# Patient Record
Sex: MALE | Employment: OTHER | URBAN - METROPOLITAN AREA
[De-identification: names, ages, dates, MRNs, and addresses within clinical notes are randomized per-mention and may not be internally consistent; named-entity substitution may affect disease eponyms.]

---

## 2017-09-22 ENCOUNTER — THERAPY VISIT (OUTPATIENT)
Dept: PHYSICAL THERAPY | Facility: CLINIC | Age: 52
End: 2017-09-22

## 2017-09-22 DIAGNOSIS — M25.511 CHRONIC RIGHT SHOULDER PAIN: Primary | ICD-10-CM

## 2017-09-22 DIAGNOSIS — G89.29 CHRONIC RIGHT SHOULDER PAIN: Primary | ICD-10-CM

## 2017-09-22 DIAGNOSIS — S86.111D STRAIN OF RIGHT GASTROCNEMIUS MUSCLE, SUBSEQUENT ENCOUNTER: ICD-10-CM

## 2017-09-22 PROCEDURE — 97110 THERAPEUTIC EXERCISES: CPT | Mod: GP | Performed by: PHYSICAL THERAPIST

## 2017-09-22 PROCEDURE — 97035 APP MDLTY 1+ULTRASOUND EA 15: CPT | Mod: GP | Performed by: PHYSICAL THERAPIST

## 2017-09-22 PROCEDURE — 97161 PT EVAL LOW COMPLEX 20 MIN: CPT | Mod: GP | Performed by: PHYSICAL THERAPIST

## 2017-09-22 NOTE — MR AVS SNAPSHOT
"              After Visit Summary   2017    Eliot Oseguera    MRN: [id number]           Patient Information     Date Of Birth          1965        Visit Information        Provider Department      2017 7:30 AM Dorina Pagan PT [facility]        Today's Diagnoses     Chronic right shoulder pain    -  1    Strain of right gastrocnemius muscle, subsequent encounter           Follow-ups after your visit        Who to contact     If you have questions or need follow up information about today's clinic visit or your schedule please contact [facility] directly at [phone].  Normal or non-critical lab and imaging results will be communicated to you by Onepagerhart, letter or phone within 4 business days after the clinic has received the results. If you do not hear from us within 7 days, please contact the clinic through Onepagerhart or phone. If you have a critical or abnormal lab result, we will notify you by phone as soon as possible.  Submit refill requests through Fetchnotes or call your pharmacy and they will forward the refill request to us. Please allow 3 business days for your refill to be completed.          Additional Information About Your Visit        MyChart Information     Fetchnotes lets you send messages to your doctor, view your test results, renew your prescriptions, schedule appointments and more. To sign up, go to [email or web address] . Click on \"Log in\" on the left side of the screen, which will take you to the Welcome page. Then click on \"Sign up Now\" on the right side of the page.     You will be asked to enter the access code listed below, as well as some personal information. Please follow the directions to create your username and password.     Your access code is: [email or web address]  Expires: 2017  5:05 PM     Your access code will  in 90 days. If you need help or a new code, " please call your [facility] clinic or [phone].        Care EveryWhere ID     This is your Care EveryWhere ID. This could be used by other organizations to access your [facility] medical records  [id number]         Blood Pressure from Last 3 Encounters:   No data found for BP    Weight from Last 3 Encounters:   No data found for Wt              We Performed the Following     FIDELINA Inital Eval Report     PT Eval, Low Complexity (52030)     Therapeutic Exercises     Ultrasound Therapy        Primary Care Provider    None Specified       No primary provider on file.        Equal Access to Services     [facility] : Hadii aad ku hadasho Soomaali, waaxda luqadaha, qaybta kaalmada adeegyada, waxay prosperin cheyannen chin cruz . So [facility] [phone].    ATENCIÓN: Si habla español, tiene a hong disposición servicios gratuitos de asistencia lingüística. Llame al [phone].    We comply with applicable federal civil rights laws and [place] laws. We do not discriminate on the basis of race, color, national origin, age, disability sex, sexual orientation or gender identity.            Thank you!     Thank you for choosing [facility]  for your care. Our goal is always to provide you with excellent care. Hearing back from our patients is one way we can continue to improve our services. Please take a few minutes to complete the written survey that you may receive in the mail after your visit with us. Thank you!             Your Updated Medication List - Protect others around you: Learn how to safely use, store and throw away your medicines at www.disposemymeds.org.      Notice  As of 9/22/2017  5:05 PM    You have not been prescribed any medications.

## 2017-09-22 NOTE — PROGRESS NOTES
[facility] Initial Evaluation          Subjective:    Patient is a 52 year old male presenting with rehab right shoulder hpi and rehab right ankle/foot hpi.   Eliot Oseguera is a 52 year old male with a right shoulder condition.  Condition occurred with:  Repetition/overuse (Pt. has had ongoing R sh pain x 1 year w/out incident. He feels it is related to UE lifting at the gym. He stopped lifting months ago but the pain continues. X-rays 6 mos ago were (-). He had 2 sessions of PT elsewhere about 6 mos ago with ex's w/out help.).  Condition occurred: for unknown reasons.  This is a chronic condition  2016.    Patient reports pain:  Anterior and lateral.  Radiates to:  Upper arm.  Pain is described as sharp and is intermittent and reported as 6/10.  Associated symptoms:  Loss of motion/stiffness and loss of strength. Pain is worse during the night.  Symptoms are exacerbated by using arm overhead, lifting and lying on extremity and relieved by rest.  Since onset symptoms are unchanged.  Special tests:  X-ray (negative).  Previous treatment includes physical therapy (2 sessions elsewhere).  There was no (He was shown theraband ex's but he did not do them because they hurt) improvement following previous treatment.  General health as reported by patient is good.                            Eliot Oseguera is a 52 year old male with a right ankle condition.  Condition occurred with:  Running (Pt. injured his R calf playing soccer 3 months ago. Pt. has rested then attempted to play several times since but the calf was aggravated each time. Pt. has no hx of R calf injury. ).  Condition occurred: during recreation/sport.  This is a chronic condition  6-22-17.    Site of Pain: R distal gastroc/soleus.  Radiates to:  No radiation.  Pain is described as sharp and is intermittent and reported as 6/10.  Associated symptoms:  Loss of motion/stiffness and loss of strength. Worse during: no time pattern.   Symptoms are exacerbated by running and relieved by rest.  Since onset symptoms are gradually improving.  Special testing: none.  Previous treatment: none.    General health as reported by patient is good.                                              Objective:    Standing Alignment:      Shoulder/UE:  Rounded shoulders              Gait:    Gait Type:  Antalgic     Deviations:  Ankle:  Push off decr R    Flexibility/Screens:   Positive screens:  Shoulder    Lower Extremity:      Decreased right lower extremity flexibility:  Gastroc and Soleus          Ankle/Foot Evaluation  ROM:    AROM:    Dorsiflexion:  Left:   10  Right:   10  Plantarflexion:  Left:  75    Right:  75            Strength:    Dorsiflexion:  Right: 5/5   Pain:  Plantarflexion: Right: 4/5  Pain:  Inversion:Right: 5/5  Pain:  Eversion:Right: 5/5  Pain:                  LIGAMENT TESTING: normal                PALPATION:     Right ankle tenderness present at:   gastroc/soleus  EDEMA: normal          MOBILITY TESTING: normal                             Shoulder Evaluation:  ROM:  AROM:    Flexion:  Left:  165    Right:  160    Abduction:  Left: 175   Right:  155    Internal Rotation:  Left:  75    Right:  65  External Rotation:  Left:  57    Right:  43                      Strength:    Flexion: Right: 4/5     Pain:   Extension:  Right: 4/5    Pain:  Abduction:  Right: 4-/5     Pain:    Internal Rotation:  Right: 4/5     Pain:  External Rotation:   Right:4-/5     Pain:    Horizontal Abduction:  Right:4-/5    Pain:        Stability Testing:  normal      Special Tests:      Right shoulder positive for the following special tests:Impingement  Right shoulder negative for the following special tests:Rotator cuff tear  Palpation:      Right shoulder tenderness present at: Infraspinatus and Teres Minor  Mobility Tests:  normal                                                 General     ROS    Assessment/Plan:      Patient is a 52 year old male with right side  shoulder and right side ankle complaints.    Patient has the following significant findings with corresponding treatment plan.                Diagnosis 1:  R gastroc/soleus strain  Pain -  US, manual therapy, self management and education  Decreased ROM/flexibility - manual therapy and therapeutic exercise  Decreased strength - therapeutic exercise and therapeutic activities  Impaired muscle performance - neuro re-education  Decreased function - therapeutic activities  Diagnosis 2:  R shoulder pain   Pain -  self management and education  Decreased ROM/flexibility - manual therapy and therapeutic exercise  Decreased strength - therapeutic exercise and therapeutic activities  Impaired muscle performance - neuro re-education  Decreased function - therapeutic activities    Therapy Evaluation Codes:   1) History comprised of:   Personal factors that impact the plan of care:      Time since onset of symptoms.    Comorbidity factors that impact the plan of care are:      None.     Medications impacting care: None.  2) Examination of Body Systems comprised of:   Body structures and functions that impact the plan of care:      Ankle and Shoulder.   Activity limitations that impact the plan of care are:      Lifting, Running and Sports.  3) Clinical presentation characteristics are:   Stable/Uncomplicated.  4) Decision-Making    Low complexity using standardized patient assessment instrument and/or measureable assessment of functional outcome.  Cumulative Therapy Evaluation is: Low complexity.    Previous and current functional limitations:  (See Goal Flow Sheet for this information)    Short term and Long term goals: (See Goal Flow Sheet for this information)     Communication ability:  Patient appears to be able to clearly communicate and understand verbal and written communication and follow directions correctly.  Treatment Explanation - The following has been discussed with the patient:   RX ordered/plan of  care  Anticipated outcomes  Possible risks and side effects  This patient would benefit from PT intervention to resume normal activities.   Rehab potential is good.    Frequency:  1 X week, once daily  Duration:  for 1 week  Discharge Plan:  Achieve all LTG.  Independent in home treatment program.  Reach maximal therapeutic benefit.    Please refer to the daily flowsheet for treatment today, total treatment time and time spent performing 1:1 timed codes.

## 2017-09-25 NOTE — PROGRESS NOTES
Subjective:    Patient is a 52 year old male presenting with rehab left ankle/foot hpi.                                              Current occupation is Self employed.    Primary job tasks include:  Other (painting).                                Objective:    System    Physical Exam    General     ROS    Assessment/Plan:

## 2021-03-08 ENCOUNTER — TELEPHONE (OUTPATIENT)
Dept: NURSING | Facility: CLINIC | Age: 56
End: 2021-03-08

## 2021-03-08 NOTE — TELEPHONE ENCOUNTER
Patient reports that he has an upcoming appointment tomorrow for back pain. Patient is having a lot of pain and he is wondering what can be done in the clinic for pain. Patient is advised that medication can be prescribed, unsure if injection can be given. All further questions in regards to appointment answered. Patient verbalizes understanding and denies further questions at this time.    Rosie Fitzgerald RN  [facility]

## 2024-11-18 ENCOUNTER — OFFICE VISIT (OUTPATIENT)
Dept: FAMILY MEDICINE | Facility: CLINIC | Age: 59
End: 2024-11-18
Payer: COMMERCIAL

## 2024-11-18 VITALS
OXYGEN SATURATION: 97 % | WEIGHT: 159.38 LBS | HEIGHT: 69 IN | HEART RATE: 61 BPM | RESPIRATION RATE: 16 BRPM | BODY MASS INDEX: 23.6 KG/M2 | SYSTOLIC BLOOD PRESSURE: 115 MMHG | DIASTOLIC BLOOD PRESSURE: 65 MMHG | TEMPERATURE: 97.9 F

## 2024-11-18 DIAGNOSIS — J34.2 DEVIATED NASAL SEPTUM: ICD-10-CM

## 2024-11-18 DIAGNOSIS — Z12.11 SCREEN FOR COLON CANCER: Primary | ICD-10-CM

## 2024-11-18 DIAGNOSIS — L24.81 IRRITANT CONTACT DERMATITIS DUE TO METALS: ICD-10-CM

## 2024-11-18 DIAGNOSIS — H10.13 ALLERGIC CONJUNCTIVITIS, BILATERAL: ICD-10-CM

## 2024-11-18 DIAGNOSIS — H61.21 IMPACTED CERUMEN OF RIGHT EAR: ICD-10-CM

## 2024-11-18 PROBLEM — I21.4 NSTEMI (NON-ST ELEVATED MYOCARDIAL INFARCTION) (H): Status: ACTIVE | Noted: 2023-11-27

## 2024-11-18 PROCEDURE — 69209 REMOVE IMPACTED EAR WAX UNI: CPT | Mod: RT | Performed by: PHYSICIAN ASSISTANT

## 2024-11-18 PROCEDURE — 99203 OFFICE O/P NEW LOW 30 MIN: CPT | Mod: 25 | Performed by: PHYSICIAN ASSISTANT

## 2024-11-18 RX ORDER — ASPIRIN 81 MG/1
81 TABLET, CHEWABLE ORAL
COMMUNITY
Start: 2023-12-08

## 2024-11-18 RX ORDER — ACETAMINOPHEN 325 MG/1
2 TABLET ORAL EVERY 4 HOURS PRN
COMMUNITY
Start: 2023-11-29

## 2024-11-18 RX ORDER — METOPROLOL SUCCINATE 25 MG/1
12.5 TABLET, EXTENDED RELEASE ORAL
COMMUNITY
Start: 2023-12-08

## 2024-11-18 RX ORDER — TRIAMCINOLONE ACETONIDE 1 MG/G
CREAM TOPICAL 2 TIMES DAILY
Qty: 80 G | Refills: 0 | Status: SHIPPED | OUTPATIENT
Start: 2024-11-18

## 2024-11-18 RX ORDER — NITROGLYCERIN 0.4 MG/1
0.4 TABLET SUBLINGUAL
COMMUNITY
Start: 2024-08-12

## 2024-11-18 RX ORDER — ROSUVASTATIN CALCIUM 20 MG/1
20 TABLET, COATED ORAL
COMMUNITY
Start: 2023-12-08

## 2024-11-18 RX ORDER — LISINOPRIL 2.5 MG/1
2.5 TABLET ORAL
COMMUNITY
Start: 2023-12-08

## 2024-11-18 RX ORDER — OLOPATADINE HYDROCHLORIDE 1 MG/ML
1 SOLUTION/ DROPS OPHTHALMIC 2 TIMES DAILY
Qty: 5 ML | Refills: 3 | Status: SHIPPED | OUTPATIENT
Start: 2024-11-18

## 2024-11-18 RX ORDER — CLOPIDOGREL BISULFATE 75 MG/1
75 TABLET ORAL
COMMUNITY
Start: 2023-12-08

## 2024-11-18 NOTE — PATIENT INSTRUCTIONS
Debrox/mineral oil drops in ears can soften wax and this can come out during shower.     Isopropyl alcohol diluted with distilled water drops in each ear after shower can prevent wax from accumulating.

## 2024-11-18 NOTE — PROGRESS NOTES
Assessment & Plan     Screen for colon cancer  Due  - Colonoscopy Screening  Referral; Future    Impacted cerumen of right ear  Noted on right.  Likely reason for decreased hearing.  Status post lavage which was performed by medical assistant tolerated well without complications.  This resolves occlusion.  Rechecked by myself.  Follow-up as needed.  Discussed given hair as well as wax history lying a potential role in ongoing itching.  Discussed mineral oil/Debrox drops to soften wax before shower as well as post shower diluted isopropyl alcohol to aid in wax prevention.  Otherwise does have a past history of allergic rhinitis which may be causing some of it itching.  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  As needed Kenalog and may benefit from a different necklace to prevent this from persistently occurring.  Recommending no use of Kenalog for more than 2 consecutive weeks.  - triamcinolone (KENALOG) 0.1 % external cream; Apply topically 2 times daily.  Medication use and side effects discussed with the patient. Patient is in complete understanding and agreement with plan.     Deviated nasal septum  Exam.  No snoring.  Overall does not affect patient's day-to-day activity.  Will monitor for now.  Flonase may aid if congestion occurs.  Otherwise consider ENT referral in the future.    Allergic conjunctivitis, bilateral  Given allergic rhinitis history as well as description, likely allergic etiology.  Does have multiple cats and dogs in the house.  Also paints for living some environmental exposures are high.  Recommending as needed antihistamine/mast cell stabilizer drops.  If no improvement in 2 weeks consider addition of Flonase.  In regards to questions on  starting herbal remedy for allergies that he has been on in the past, would recommend awaiting his Plavix being stopped and then afterwards I do not have any significant concerns of him starting this as long as no stimulation substances are present.  - olopatadine (PATANOL) 0.1 % ophthalmic solution; Place 1 drop into both eyes 2 times daily.  Medication use and side effects discussed with the patient. Patient is in complete understanding and agreement with plan.             Selin Mccabe is a 59 year old, presenting for the following health issues:  Pruritis (Itching in ears and on chest)        11/18/2024     2:17 PM   Additional Questions   Roomed by Kimberly CUEVA CMA   Accompanied by Wife     History of Present Illness       Reason for visit:  Colonoscopy  ears problems      Patient is a 59-year-old male with a history of coronary artery disease status post PCI approximately 1 year ago followed by cardiology through [facility].  He presents today to establish care as well as below concerns        Patient states for approximately 3 months has had bilateral eye itching.  Primarily worse in the morning and primarily worse on the left.  No treatments tried.  It is tolerable but at times severely itchy.  No pain.  No vision changes.  No known cause.    He also has a history of ear itching bilaterally for 3 to 4 months.  Also has reduced hearing in the right ear compared to the left.  He states approximately 3 to 4 months ago did have a ear infection of his right ear that was treated and [facility] that he had wax in his right ear in his left ear.  Left ear was cleaned right ear was not due to infection.    Patient also states has had intermittent itching in his central chest since his NSTEMI approximately 1 year ago.  He feels this may be due to his nitro tablets necklace and has since been wearing it on his back and his itching has improved.  No significant rash seen.  No discoloration.   "No history of similar symptoms.    Patient also states for years has noticed difficulty breathing out of 1 nostril compared to the other.  He does not know which 1 is worse but he recalls this being a chronic problem without any recent changes.  Of note, per EMR review does have a past history of allergic rhinitis and he does admit to sneezing often.  Prior to his NSTEMI was taking an herbal remedy to aid with allergies and has not taken in 1 year.  He is wondering if this is safe to start given his heart history    Patient also is due for colonoscopy.  He would like this ordered.  States had this 10 years ago and states it was normal.  Per EMR review does have a history of adenomatous polyp        Objective    /65 (BP Location: Left arm, Patient Position: Sitting, Cuff Size: Adult Regular)   Pulse 61   Temp 97.9  F (36.6  C) (Oral)   Resp 16   Ht 1.74 m (5' 8.5\")   Wt 72.3 kg (159 lb 6 oz)   SpO2 97%   BMI 23.88 kg/m    Body mass index is 23.88 kg/m .  Physical Exam   GENERAL: alert and no distress  Eyes; PERRLA.  EOM grossly intact bilaterally.  Conjunctiva clear sclera clear no drainage or discharge.  HENT: normal cephalic/atraumatic, right ear: occluded with wax and status post lavage clear with normal TM, left ear: Wax throughout multiple hair locations.  Not occluded.  Normal TM.  Deviated septum to right noted.  SKIN: no suspicious lesions or rashes  PSYCH: mentation appears normal, affect normal/bright        Signed Electronically by: Joe Mei PA-C  The longitudinal plan of care for the diagnosis(es)/condition(s) as documented were addressed during this visit. Due to the added complexity in care, I will continue to support Eliot in the subsequent management and with ongoing continuity of care.    "

## 2024-12-16 ENCOUNTER — TELEPHONE (OUTPATIENT)
Dept: GASTROENTEROLOGY | Facility: CLINIC | Age: 59
End: 2024-12-16
Payer: COMMERCIAL

## 2024-12-16 NOTE — TELEPHONE ENCOUNTER
Pt's spouse called to schedule. Writer explained to caller the process for using [facility] insurance/costa sharing groups. Caller stated they did not understand why it worked the way and did not believe it was correct. Writer offered to transfer with caller to cost of care for clarification but caller declined. Writer explained that they could schedule pt, but the pt would need to then speak with cost of care team to discuss payment per policy. Caller stated they wanted to to speak with the insurance company.

## 2025-02-12 ENCOUNTER — NURSE TRIAGE (OUTPATIENT)
Dept: FAMILY MEDICINE | Facility: CLINIC | Age: 60
End: 2025-02-12
Payer: COMMERCIAL

## 2025-02-12 NOTE — TELEPHONE ENCOUNTER
*Patient's wife called and provided information for triage. No consent on file. Patient was not present with wife during conversation. No PHI shared during call.    Nurse Triage SBAR    Is this a 2nd Level Triage? NO    Situation:  URI x 1 month    Background:  Flu-like symptoms about 1 month ago, did not seek testing or treatment. Symptoms have improved but have not resolved. Hx of NSTEMI.    Assessment:  Cough x 1 month, stuffy nose also present. Sx have improved since onset, but patient is concerned that they have not resolved. Denies SOB, wheezing, chest pain.     Protocol Recommended Disposition:   See in Office Today or Tomorrow    Recommendation:  Scheduled to see PCP tomorrow (2/13) for evaluation of ongoing symptoms.     Dafne Pritchett RN      Reason for Disposition   Patient wants to be seen    Additional Information   Negative: Bluish (or gray) lips or face   Negative: SEVERE difficulty breathing (e.g., struggling for each breath, speaks in single words)   Negative: Rapid onset of cough and has hives   Negative: Coughing started suddenly after medicine, an allergic food or bee sting   Negative: Difficulty breathing after exposure to flames, smoke, or fumes   Negative: Sounds like a life-threatening emergency to the triager   Negative: Previous asthma attacks and this feels like asthma attack   Negative: Dry cough (non-productive; no sputum or minimal clear sputum) and within 14 days of COVID-19 Exposure   Negative: MODERATE difficulty breathing (e.g., speaks in phrases, SOB even at rest, pulse 100-120) and still present when not coughing   Negative: Chest pain present when not coughing   Negative: Passed out (e.g., fainted, lost consciousness, blacked out and was not responding)   Negative: Patient sounds very sick or weak to the triager   Negative: MILD difficulty breathing (e.g., minimal/no SOB at rest, SOB with walking, pulse <100) and still present when not coughing   Negative: Coughed up > 1  tablespoon (15 ml) blood (Exception: Blood-tinged sputum.)   Negative: Fever > 103 F (39.4 C)   Negative: Fever > 101 F (38.3 C) and over 60 years of age   Negative: Fever > 100 F (37.8 C) and has diabetes mellitus or a weak immune system (e.g., HIV positive, cancer chemotherapy, organ transplant, splenectomy, chronic steroids)   Negative: Fever > 100 F (37.8 C) and bedridden (e.g., CVA, chronic illness, recovering from surgery)   Negative: Increasing ankle swelling   Negative: Wheezing is present   Negative: SEVERE coughing spells (e.g., whooping sound after coughing, vomiting after coughing)   Negative: Coughing up tamanna-colored (reddish-brown) or blood-tinged sputum   Negative: Fever present > 3 days (72 hours)   Negative: Fever returns after gone for over 24 hours and symptoms worse or not improved   Negative: Using nasal washes and pain medicine > 24 hours and sinus pain persists   Negative: Known COPD or other severe lung disease (i.e., bronchiectasis, cystic fibrosis, lung surgery) and symptoms getting worse (i.e., increased sputum purulence or amount, increased breathing difficulty)   Negative: Continuous (nonstop) coughing interferes with work or school and no improvement using cough treatment per Care Advice    Protocols used: Cough-A-OH

## 2025-02-13 ENCOUNTER — OFFICE VISIT (OUTPATIENT)
Dept: FAMILY MEDICINE | Facility: CLINIC | Age: 60
End: 2025-02-13
Payer: COMMERCIAL

## 2025-02-13 VITALS
SYSTOLIC BLOOD PRESSURE: 110 MMHG | RESPIRATION RATE: 16 BRPM | WEIGHT: 163.25 LBS | TEMPERATURE: 97.6 F | HEIGHT: 69 IN | BODY MASS INDEX: 24.18 KG/M2 | DIASTOLIC BLOOD PRESSURE: 58 MMHG | OXYGEN SATURATION: 100 % | HEART RATE: 56 BPM

## 2025-02-13 DIAGNOSIS — R05.8 POST-VIRAL COUGH SYNDROME: Primary | ICD-10-CM

## 2025-02-13 DIAGNOSIS — H10.13 ALLERGIC CONJUNCTIVITIS, BILATERAL: ICD-10-CM

## 2025-02-13 DIAGNOSIS — J34.2 DEVIATED NASAL SEPTUM: ICD-10-CM

## 2025-02-13 RX ORDER — ALBUTEROL SULFATE 90 UG/1
2 INHALANT RESPIRATORY (INHALATION) EVERY 6 HOURS PRN
Qty: 18 G | Refills: 0 | Status: SHIPPED | OUTPATIENT
Start: 2025-02-13

## 2025-02-13 RX ORDER — FLUTICASONE PROPIONATE 50 MCG
1 SPRAY, SUSPENSION (ML) NASAL DAILY
COMMUNITY
Start: 2025-02-13

## 2025-02-13 RX ORDER — BENZONATATE 100 MG/1
100 CAPSULE ORAL 3 TIMES DAILY PRN
Qty: 42 CAPSULE | Refills: 1 | Status: SHIPPED | OUTPATIENT
Start: 2025-02-13

## 2025-02-13 ASSESSMENT — ENCOUNTER SYMPTOMS: COUGH: 1

## 2025-02-13 NOTE — PROGRESS NOTES
Assessment & Plan     Post-viral cough syndrome  No coughing throughout exam.  Respiratory exam within normal limits satting at 100% room air.  Felt likely secondary to possible postviral syndrome and persistent postnasal drainage.  May also be secondary to allergic rhinitis.  Description does sound as possible reactive airway with exercise as well.  Discussed over-the-counter management as well as Flonase, Tessalon, and as needed albuterol for coughing fits especially related to exercise.  Patient has Flonase at home.  Recommend starting using daily.  If no improvement in 2 to 4 weeks, consider possible ACE induced cough given history of allergic rhinitis.  - benzonatate (TESSALON) 100 MG capsule; Take 1 capsule (100 mg) by mouth 3 times daily as needed for cough.  - albuterol (PROAIR HFA/PROVENTIL HFA/VENTOLIN HFA) 108 (90 Base) MCG/ACT inhaler; Inhale 2 puffs into the lungs every 6 hours as needed for shortness of breath, wheezing or cough.  Medication use and side effects discussed with the patient. Patient is in complete understanding and agreement with plan.   Allergic conjunctivitis, bilateral  Known history.  Patient is interested in allergic testing.  Referral to allergist to consider this made.  [place] [facility] and allergy referral given therefore if deviated septum felt likely etiology, may be seen by their ENT clinic as well.  - Adult Allergy/Asthma  Referral; Future    Deviated nasal septum    - Adult Allergy/Asthma  Referral; Future            Subjective   Eliot is a 59 year old, presenting for the following health issues:  Cough and Nasal Congestion        2/13/2025     9:08 AM   Additional Questions   Roomed by Kimberly CUEVA CMA   Accompanied by Self     HPI   Is a 59-year-old male with a past history of coronary artery disease followed by cardiology patient is a 59-year-old male with a past history of coronary artery disease followed by cardiology as well as allergic rhinitis and  "deviated septum.  He presents today for a 1 month history of an ongoing mild productive cough with intermittent coughing fits with activities such as exercise/running as well as persistent nasal congestion and runny nose.  Denies any sinus pain or sinus congestion.  No ear pain.  No fevers or chills.  No shortness of breath or chest pains.  He states his symptoms began shortly after returning from a cruise.  No change over time.  No treatments tried.    Patient is also interested in possible allergy testing.  He has 2 dogs and a cat at home and is wondering about also environmental allergies.    Patient was also started on lisinopril approximately 1 year ago.  Up until this point, no chronic cough has been experienced.          Objective    /58 (BP Location: Left arm, Patient Position: Sitting, Cuff Size: Adult Regular)   Pulse 56   Temp 97.6  F (36.4  C) (Oral)   Resp 16   Ht 1.74 m (5' 8.5\")   Wt 74 kg (163 lb 4 oz)   SpO2 100%   BMI 24.46 kg/m    Body mass index is 24.46 kg/m .  Physical Exam   GENERAL: alert and no distress  EYES: Eyes grossly normal to inspection, PERRL and conjunctivae and sclerae normal  HENT: normal cephalic/atraumatic, ear canals and TM's normal, nasal mucosa edematous , oropharynx clear, oral mucous membranes moist, and sinuses: not tender  RESP: lungs clear to auscultation - no rales, rhonchi or wheezes  CV: regular rates and rhythm, normal S1 S2, no S3 or S4, and no murmur, click or rub  PSYCH: mentation appears normal, affect normal/bright          Signed Electronically by: Joe Mei PA-C  The longitudinal plan of care for the diagnosis(es)/condition(s) as documented were addressed during this visit. Due to the added complexity in care, I will continue to support Eliot in the subsequent management and with ongoing continuity of care.    "

## 2025-02-13 NOTE — PROGRESS NOTES
"  {PROVIDER CHARTING PREFERENCE:005255}    Selin Mccabe is a 59 year old, presenting for the following health issues:  Cough and Nasal Congestion      2/13/2025     9:08 AM   Additional Questions   Roomed by Kimberly CUEVA CMA   Accompanied by Self     Cough         {MA/LPN/RN Pre-Provider Visit Orders- hCG/UA/Strep (Optional):517291}  {SUPERLIST (Optional):519096}  {additonal problems for provider to add (Optional):745015}    {ROS Picklists (Optional):796583}      Objective    /58 (BP Location: Left arm, Patient Position: Sitting, Cuff Size: Adult Regular)   Pulse 56   Temp 97.6  F (36.4  C) (Oral)   Resp 16   Ht 1.74 m (5' 8.5\")   Wt 74 kg (163 lb 4 oz)   SpO2 100%   BMI 24.46 kg/m    Body mass index is 24.46 kg/m .  Physical Exam   {Exam List (Optional):808505}    {Diagnostic Test Results (Optional):893829}        Signed Electronically by: Joe Mei PA-C  {Email feedback regarding this note to [email or web address]   :[id number]}  "

## 2025-03-10 ENCOUNTER — TRANSFERRED RECORDS (OUTPATIENT)
Dept: HEALTH INFORMATION MANAGEMENT | Facility: CLINIC | Age: 60
End: 2025-03-10
Payer: COMMERCIAL

## 2025-07-19 ENCOUNTER — HEALTH MAINTENANCE LETTER (OUTPATIENT)
Age: 60
End: 2025-07-19

## 2025-08-05 ENCOUNTER — OFFICE VISIT (OUTPATIENT)
Dept: FAMILY MEDICINE | Facility: CLINIC | Age: 60
End: 2025-08-05
Payer: COMMERCIAL

## 2025-08-05 VITALS
WEIGHT: 156.38 LBS | DIASTOLIC BLOOD PRESSURE: 61 MMHG | HEIGHT: 69 IN | OXYGEN SATURATION: 98 % | RESPIRATION RATE: 16 BRPM | TEMPERATURE: 98.4 F | SYSTOLIC BLOOD PRESSURE: 111 MMHG | BODY MASS INDEX: 23.16 KG/M2 | HEART RATE: 55 BPM

## 2025-08-05 DIAGNOSIS — Z95.5 S/P PRIMARY ANGIOPLASTY WITH CORONARY STENT: ICD-10-CM

## 2025-08-05 DIAGNOSIS — I24.9 ACS (ACUTE CORONARY SYNDROME) (H): ICD-10-CM

## 2025-08-05 DIAGNOSIS — H61.21 IMPACTED CERUMEN OF RIGHT EAR: Primary | ICD-10-CM

## 2025-08-05 PROCEDURE — 69209 REMOVE IMPACTED EAR WAX UNI: CPT | Mod: RT | Performed by: FAMILY MEDICINE
